# Patient Record
Sex: FEMALE | URBAN - METROPOLITAN AREA
[De-identification: names, ages, dates, MRNs, and addresses within clinical notes are randomized per-mention and may not be internally consistent; named-entity substitution may affect disease eponyms.]

---

## 2021-09-28 ENCOUNTER — TELEPHONE ENCOUNTER (OUTPATIENT)
Dept: URBAN - METROPOLITAN AREA CLINIC 19 | Facility: CLINIC | Age: 61
End: 2021-09-28

## 2021-09-28 ENCOUNTER — LAB OUTSIDE AN ENCOUNTER (OUTPATIENT)
Dept: URBAN - METROPOLITAN AREA TELEHEALTH 2 | Facility: TELEHEALTH | Age: 61
End: 2021-09-28

## 2021-09-28 ENCOUNTER — OFFICE VISIT (OUTPATIENT)
Dept: URBAN - METROPOLITAN AREA CLINIC 19 | Facility: CLINIC | Age: 61
End: 2021-09-28

## 2021-09-28 ENCOUNTER — OFFICE VISIT (OUTPATIENT)
Dept: URBAN - METROPOLITAN AREA TELEHEALTH 2 | Facility: TELEHEALTH | Age: 61
End: 2021-09-28
Payer: COMMERCIAL

## 2021-09-28 DIAGNOSIS — Z12.11 COLON CANCER SCREENING: ICD-10-CM

## 2021-09-28 DIAGNOSIS — K21.9 GASTROESOPHAGEAL REFLUX DISEASE, UNSPECIFIED WHETHER ESOPHAGITIS PRESENT: ICD-10-CM

## 2021-09-28 PROCEDURE — 99204 OFFICE O/P NEW MOD 45 MIN: CPT | Performed by: INTERNAL MEDICINE

## 2021-09-28 RX ORDER — SODIUM, POTASSIUM,MAG SULFATES 17.5-3.13G
354ML SOLUTION, RECONSTITUTED, ORAL ORAL
Qty: 354 MILLILITER | Refills: 0 | OUTPATIENT
Start: 2021-09-28 | End: 2021-09-29

## 2021-09-28 NOTE — HPI-ZZZ
Ms. Clay is a 61 year-old female who presents today for GERD. Today, she reports she had a chronic cough for couple of years.  She reports in June/July 2021 it got worse.  Her symptoms were mostly in the morning and sometimes at night.  She reports a friend who is a nurse told her to try Nexium.  She reports she tried it for 2 weeks and the cough went away.  A week later her symptoms returned and she took it for 2 more weeks.  She also reports a burning sensation in her throat especially at night.  No previous EGD.  She also reports she has not had a colonoscopy in 10 years.  Her last one was with Dr. Herrmann and she reports she had no polyps or abnormalities at that time.  No family history of colon cancer.  She reports daily bowel movements with no rectal bleeding.  She denies prescription blood thinners, cardiac/kidney disease, diabetes, and home O2.

## 2021-09-29 PROBLEM — 235595009: Status: ACTIVE | Noted: 2021-09-28

## 2021-09-29 PROBLEM — 305058001: Status: ACTIVE | Noted: 2021-09-28

## 2021-10-25 ENCOUNTER — TELEPHONE ENCOUNTER (OUTPATIENT)
Dept: URBAN - METROPOLITAN AREA CLINIC 19 | Facility: CLINIC | Age: 61
End: 2021-10-25

## 2021-11-17 ENCOUNTER — OFFICE VISIT (OUTPATIENT)
Dept: URBAN - METROPOLITAN AREA SURGERY CENTER 31 | Facility: SURGERY CENTER | Age: 61
End: 2021-11-17

## 2024-08-13 ENCOUNTER — LAB OUTSIDE AN ENCOUNTER (OUTPATIENT)
Dept: URBAN - METROPOLITAN AREA CLINIC 19 | Facility: CLINIC | Age: 64
End: 2024-08-13

## 2024-08-14 ENCOUNTER — OFFICE VISIT (OUTPATIENT)
Dept: URBAN - METROPOLITAN AREA CLINIC 19 | Facility: CLINIC | Age: 64
End: 2024-08-14
Payer: COMMERCIAL

## 2024-08-14 ENCOUNTER — DASHBOARD ENCOUNTERS (OUTPATIENT)
Age: 64
End: 2024-08-14

## 2024-08-14 VITALS
OXYGEN SATURATION: 98 % | WEIGHT: 220 LBS | DIASTOLIC BLOOD PRESSURE: 70 MMHG | BODY MASS INDEX: 35.36 KG/M2 | SYSTOLIC BLOOD PRESSURE: 120 MMHG | HEART RATE: 61 BPM | HEIGHT: 66 IN | TEMPERATURE: 98.6 F

## 2024-08-14 DIAGNOSIS — K21.9 GERD: ICD-10-CM

## 2024-08-14 PROCEDURE — 99204 OFFICE O/P NEW MOD 45 MIN: CPT

## 2024-08-14 PROCEDURE — 99214 OFFICE O/P EST MOD 30 MIN: CPT

## 2024-08-14 RX ORDER — PANTOPRAZOLE SODIUM 20 MG/1
1 TABLET 30 MINUTES BEFORE BREAKFAST AND DINNER TABLET, DELAYED RELEASE ORAL TWICE A DAY
Qty: 90 TABLET | Refills: 1 | OUTPATIENT
Start: 2024-08-14

## 2024-08-14 NOTE — HPI-TODAY'S VISIT:
64-year-old female with PMH of [place] (CPAP at ) elicits the following symptoms: Heartburn, epigastric pain Duration of symptoms: One year Location of symptoms: Esophagus Associated symptoms: Bloating, cough in the morning Prior over the counter or prescription medications: Nexium and pepcid. Takes 400 mg of Advil daily for joint pain Current stress: No Any recent weight changes: Has gained weight since assisted in 2021 ~30 lbs. She is not as active. Any recent changes in medications: No Any recent changes in diet: No Any past history of gastric/esophageal ulcers or Barretts esophagus: No Previous work up- labs,imaging: None Last EGD: Never Last colonoscopy ~6-7 yrs ago by Dr. Herrmann and it was reportedly normal

## 2024-08-15 LAB
A/G RATIO: 1.7
ALBUMIN: 4.3
ALKALINE PHOSPHATASE: 84
ALT (SGPT): 18
AST (SGOT): 21
BILIRUBIN, TOTAL: 0.6
BUN/CREATININE RATIO: (no result)
BUN: 11
CALCIUM: 9.4
CARBON DIOXIDE, TOTAL: 28
CHLORIDE: 101
CREATININE: 0.63
EGFR: 99
GLOBULIN, TOTAL: 2.6
GLUCOSE: 85
HEMATOCRIT: 36.9
HEMOGLOBIN: 12.1
IMMUNOGLOBULIN A: 115
INTERPRETATION: (no result)
LIPASE: 17
MCH: 31.7
MCHC: 32.8
MCV: 96.6
MPV: 10.3
PLATELET COUNT: 224
POTASSIUM: 4
PROTEIN, TOTAL: 6.9
RDW: 14
RED BLOOD CELL COUNT: 3.82
SODIUM: 138
TISSUE TRANSGLUTAMINASE AB, IGA: <1
WHITE BLOOD CELL COUNT: 5.5

## 2024-09-20 ENCOUNTER — CLAIMS CREATED FROM THE CLAIM WINDOW (OUTPATIENT)
Dept: URBAN - METROPOLITAN AREA SURGERY CENTER 31 | Facility: SURGERY CENTER | Age: 64
End: 2024-09-20
Payer: COMMERCIAL

## 2024-09-20 DIAGNOSIS — R10.13 ABDOMINAL DISCOMFORT, EPIGASTRIC: ICD-10-CM

## 2024-09-20 DIAGNOSIS — R14.0 ABDOMINAL BLOATING: ICD-10-CM

## 2024-09-20 DIAGNOSIS — R05.3 CHRONIC COUGH: ICD-10-CM

## 2024-09-20 DIAGNOSIS — K29.70 GASTRIC INFLAMMATION: ICD-10-CM

## 2024-09-20 DIAGNOSIS — K31.89 ACHYLIA: ICD-10-CM

## 2024-09-20 PROCEDURE — 00731 ANES UPR GI NDSC PX NOS: CPT | Performed by: NURSE ANESTHETIST, CERTIFIED REGISTERED

## 2024-09-20 PROCEDURE — 43239 EGD BIOPSY SINGLE/MULTIPLE: CPT | Performed by: STUDENT IN AN ORGANIZED HEALTH CARE EDUCATION/TRAINING PROGRAM

## 2024-09-20 RX ORDER — PANTOPRAZOLE SODIUM 20 MG/1
1 TABLET 30 MINUTES BEFORE BREAKFAST AND DINNER TABLET, DELAYED RELEASE ORAL TWICE A DAY
Qty: 90 TABLET | Refills: 1 | Status: ACTIVE | COMMUNITY
Start: 2024-08-14

## 2024-09-26 ENCOUNTER — WEB ENCOUNTER (OUTPATIENT)
Dept: URBAN - METROPOLITAN AREA CLINIC 19 | Facility: CLINIC | Age: 64
End: 2024-09-26

## 2025-04-16 ENCOUNTER — ERX REFILL RESPONSE (OUTPATIENT)
Dept: URBAN - METROPOLITAN AREA CLINIC 19 | Facility: CLINIC | Age: 65
End: 2025-04-16

## 2025-04-16 RX ORDER — FAMOTIDINE 40 MG/1
1 TABLET TABLET, FILM COATED ORAL ONCE A DAY
Qty: 90 TABLET | Refills: 3 | OUTPATIENT

## 2025-04-16 RX ORDER — PANTOPRAZOLE SODIUM 20 MG/1
1 TABLET 30 MINUTES BEFORE BREAKFAST AND DINNER TABLET, DELAYED RELEASE ORAL TWICE A DAY
Qty: 90 TABLET | Refills: 1 | OUTPATIENT

## 2025-05-06 ENCOUNTER — TELEPHONE ENCOUNTER (OUTPATIENT)
Dept: URBAN - METROPOLITAN AREA CLINIC 19 | Facility: CLINIC | Age: 65
End: 2025-05-06